# Patient Record
(demographics unavailable — no encounter records)

---

## 2024-12-03 NOTE — PHYSICAL EXAM
[de-identified] : incision healing well. Patient pleased. Trachea midline. No cervical or supraclavicular adenopathy.  [Normal] : orientation to person, place, and time: normal [de-identified] : Grossly intact [de-identified] : Skin: no rashes or lesions Psych: normal and appropriate affect

## 2024-12-03 NOTE — HISTORY OF PRESENT ILLNESS
[de-identified] : Patient referred by nurse practitioner Giuseppe evaluation of enlarging right thyroid nodule. Patient noted to have a thyroid nodule 6 years ago with atypical biopsy and negative genetics. 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 Patient reports 18-year history of hypothyroidism. Reports a pressure sensation with gradual voice hoarseness. Denies dysphagia or radiation exposure. Thyroid ultrasound June 2024: Right lobe 5.3 x 2.7 x 1.9 cm with dominant nodule measuring 4.8 x 1.6 x 2.3 cm. Left lobe 2.8 x 0.9 x 0.5 cm, no nodules noted no enlarged lymph nodes. Blood work June 2024: TSH 0.8, total T4 10.4. FNA of right thyroid nodule in 6/2024 with benign pathology. Patient underwent right thyroid lobectomy on 6/24/24. Final pathology benign. She is doing well postop.  She denies dysphagia, hoarseness, paresthesia's, palpitations, neck pain, neck swelling. She does feel some fatigue. She is on Synthroid alternating 75 and 88 mcg every other day.  Blood work November 2024 reviewed.  In good range.  I have reviewed all old and new data and available images.

## 2024-12-03 NOTE — ASSESSMENT
[FreeTextEntry1] : Patient s/p right thyroid lobectomy for enlarging thyroid nodule, final pathology benign. She is doing well postop. Patient will continue under care of PCP and contact office if change noted. I have answered their questions to the best of my ability.